# Patient Record
Sex: MALE | Race: WHITE | NOT HISPANIC OR LATINO | URBAN - METROPOLITAN AREA
[De-identification: names, ages, dates, MRNs, and addresses within clinical notes are randomized per-mention and may not be internally consistent; named-entity substitution may affect disease eponyms.]

---

## 2018-11-28 ENCOUNTER — EMERGENCY (EMERGENCY)
Facility: HOSPITAL | Age: 28
LOS: 1 days | Discharge: LEFT BEFORE DISCHARGE | End: 2018-11-28
Attending: EMERGENCY MEDICINE | Admitting: EMERGENCY MEDICINE
Payer: SELF-PAY

## 2018-11-28 VITALS
OXYGEN SATURATION: 98 % | RESPIRATION RATE: 18 BRPM | SYSTOLIC BLOOD PRESSURE: 137 MMHG | HEART RATE: 70 BPM | DIASTOLIC BLOOD PRESSURE: 73 MMHG

## 2018-11-28 VITALS
RESPIRATION RATE: 20 BRPM | OXYGEN SATURATION: 98 % | SYSTOLIC BLOOD PRESSURE: 183 MMHG | TEMPERATURE: 98 F | HEART RATE: 136 BPM | DIASTOLIC BLOOD PRESSURE: 103 MMHG

## 2018-11-28 LAB
ALBUMIN SERPL ELPH-MCNC: 3.8 G/DL — SIGNIFICANT CHANGE UP (ref 3.4–5)
ALP SERPL-CCNC: 113 U/L — SIGNIFICANT CHANGE UP (ref 40–120)
ALT FLD-CCNC: 31 U/L — SIGNIFICANT CHANGE UP (ref 12–42)
ANION GAP SERPL CALC-SCNC: 20 MMOL/L — HIGH (ref 9–16)
APTT BLD: 27.6 SEC — SIGNIFICANT CHANGE UP (ref 27.5–36.3)
AST SERPL-CCNC: 143 U/L — HIGH (ref 15–37)
BILIRUB SERPL-MCNC: 0.4 MG/DL — SIGNIFICANT CHANGE UP (ref 0.2–1.2)
BUN SERPL-MCNC: 5 MG/DL — LOW (ref 7–23)
CALCIUM SERPL-MCNC: 9.2 MG/DL — SIGNIFICANT CHANGE UP (ref 8.5–10.5)
CHLORIDE SERPL-SCNC: 97 MMOL/L — SIGNIFICANT CHANGE UP (ref 96–108)
CO2 SERPL-SCNC: 19 MMOL/L — LOW (ref 22–31)
CREAT SERPL-MCNC: 1.02 MG/DL — SIGNIFICANT CHANGE UP (ref 0.5–1.3)
ETHANOL SERPL-MCNC: 4 MG/DL — HIGH
GLUCOSE SERPL-MCNC: 228 MG/DL — HIGH (ref 70–99)
HCT VFR BLD CALC: 30 % — LOW (ref 39–50)
HGB BLD-MCNC: 10.4 G/DL — LOW (ref 13–17)
INR BLD: 1.05 — SIGNIFICANT CHANGE UP (ref 0.88–1.16)
LACTATE SERPL-SCNC: 2.3 MMOL/L — HIGH (ref 0.4–2)
LACTATE SERPL-SCNC: 9.7 MMOL/L — CRITICAL HIGH (ref 0.4–2)
MAGNESIUM SERPL-MCNC: 1.4 MG/DL — LOW (ref 1.6–2.6)
MCHC RBC-ENTMCNC: 34.7 G/DL — SIGNIFICANT CHANGE UP (ref 32–36)
MCHC RBC-ENTMCNC: 34.9 PG — HIGH (ref 27–34)
MCV RBC AUTO: 100.7 FL — HIGH (ref 80–100)
PLATELET # BLD AUTO: 146 K/UL — LOW (ref 150–400)
POTASSIUM SERPL-MCNC: 3.4 MMOL/L — LOW (ref 3.5–5.3)
POTASSIUM SERPL-SCNC: 3.4 MMOL/L — LOW (ref 3.5–5.3)
PROT SERPL-MCNC: 8 G/DL — SIGNIFICANT CHANGE UP (ref 6.4–8.2)
PROTHROM AB SERPL-ACNC: 11.7 SEC — SIGNIFICANT CHANGE UP (ref 10–12.9)
RBC # BLD: 2.98 M/UL — LOW (ref 4.2–5.8)
RBC # FLD: 16.8 % — HIGH (ref 10.3–14.5)
SODIUM SERPL-SCNC: 136 MMOL/L — SIGNIFICANT CHANGE UP (ref 132–145)
WBC # BLD: 10.1 K/UL — SIGNIFICANT CHANGE UP (ref 3.8–10.5)
WBC # FLD AUTO: 10.1 K/UL — SIGNIFICANT CHANGE UP (ref 3.8–10.5)

## 2018-11-28 PROCEDURE — 70450 CT HEAD/BRAIN W/O DYE: CPT | Mod: 26,59

## 2018-11-28 PROCEDURE — 93010 ELECTROCARDIOGRAM REPORT: CPT

## 2018-11-28 PROCEDURE — 70496 CT ANGIOGRAPHY HEAD: CPT | Mod: 26

## 2018-11-28 PROCEDURE — 70498 CT ANGIOGRAPHY NECK: CPT | Mod: 26

## 2018-11-28 PROCEDURE — 99285 EMERGENCY DEPT VISIT HI MDM: CPT | Mod: 25

## 2018-11-28 RX ORDER — DIAZEPAM 5 MG
5 TABLET ORAL ONCE
Qty: 0 | Refills: 0 | Status: DISCONTINUED | OUTPATIENT
Start: 2018-11-28 | End: 2018-11-28

## 2018-11-28 RX ORDER — SODIUM CHLORIDE 9 MG/ML
1000 INJECTION INTRAMUSCULAR; INTRAVENOUS; SUBCUTANEOUS ONCE
Qty: 0 | Refills: 0 | Status: COMPLETED | OUTPATIENT
Start: 2018-11-28 | End: 2018-11-28

## 2018-11-28 RX ORDER — POTASSIUM CHLORIDE 20 MEQ
40 PACKET (EA) ORAL ONCE
Qty: 0 | Refills: 0 | Status: COMPLETED | OUTPATIENT
Start: 2018-11-28 | End: 2018-11-28

## 2018-11-28 RX ORDER — MAGNESIUM SULFATE 500 MG/ML
2 VIAL (ML) INJECTION ONCE
Qty: 0 | Refills: 0 | Status: DISCONTINUED | OUTPATIENT
Start: 2018-11-28 | End: 2018-12-02

## 2018-11-28 RX ADMIN — SODIUM CHLORIDE 2000 MILLILITER(S): 9 INJECTION INTRAMUSCULAR; INTRAVENOUS; SUBCUTANEOUS at 12:12

## 2018-11-28 RX ADMIN — SODIUM CHLORIDE 2000 MILLILITER(S): 9 INJECTION INTRAMUSCULAR; INTRAVENOUS; SUBCUTANEOUS at 11:16

## 2018-11-28 RX ADMIN — Medication 1 MILLIGRAM(S): at 11:16

## 2018-11-28 RX ADMIN — Medication 40 MILLIEQUIVALENT(S): at 13:42

## 2018-11-28 RX ADMIN — Medication 5 MILLIGRAM(S): at 12:13

## 2018-11-28 NOTE — ED ADULT NURSE NOTE - CHIEF COMPLAINT QUOTE
BIBA, here for altered mental status and possible syncopal episode. Polish speaking. Per EMS, bystanders said pt collapsed on the street, (+) LOC. Pt denies alcohol/drug use. Denies any PMhx. Hypertensive and tachycardic on arrival. BGL in field 150mg/dl

## 2018-11-28 NOTE — ED ADULT TRIAGE NOTE - CHIEF COMPLAINT QUOTE
BIBA, here for altered mental status and possible syncopal episode. Pt arrived alert and oriented x3. Per EMS, bystanders said pt collapsed on the street, (+) LOC. Pt denies alcohol/drug use. Denies any PMhx. Hypertensive and tachycardic on arrival BIBA, here for altered mental status and possible syncopal episode. Polish speaking. Per EMS, bystanders said pt collapsed on the street, (+) LOC. Pt denies alcohol/drug use. Denies any PMhx. Hypertensive and tachycardic on arriva. BGL in field 150mg/dl BIBA, here for altered mental status and possible syncopal episode. Polish speaking. Per EMS, bystanders said pt collapsed on the street, (+) LOC. Pt denies alcohol/drug use. Denies any PMhx. Hypertensive and tachycardic on arrival. BGL in field 150mg/dl

## 2018-11-28 NOTE — ED PROVIDER NOTE - ATTENDING CONTRIBUTION TO CARE
Patient presenting with likely sz (over syncope) given mental status on arrival and EtOH level of 4. He says he had a beer last night (eventually agreed to having had a beer). Denies EtOH abuse. VSS scalp hematoma, tremulous slightly.  Seemed "off affectively on arrival. Got better. Offered admission for likely EtOH WD (though he adamantly denied EtOH use) vs. sz. given lactate of 9 on arrival. Patient refused. extensive discussed with . Patient understood concerns and possible dx. He is a  and has to work tomorrow. Signed out AMA.

## 2018-11-28 NOTE — ED PROVIDER NOTE - OBJECTIVE STATEMENT
27 yo M with no known PMHx, BIBA for AMS. Pt reports going to work, didn't know what happened exactly but woke up in ambulance.  Per EMS, bystanders states that pt was walking, collapsed and had possible seizure like activities with head trauma.  No tongue biting, bleeding, or urinary/bowel incontinence noted.  Pt reports no active sx currently and denies any alcohol/drug use.  No prodromes, CP, SOB, palpitations, HA, dizziness, neck pain, tingling/numbness, paresthesia, abdominal pain, focal weakness, dysuria, and fever/chills.  No recent travel or sick contact noted

## 2018-11-28 NOTE — ED PROVIDER NOTE - PROGRESS NOTE DETAILS
While awaiting for transport, pt now refused to be admitted and reports feeling better and has personal errands and commitments and cannot be admitted to hospital.  Pt has decided to leave the hospital against medical advice. Risks, benefits, alternatives, and potential consequences of current condition (including the possibility of worsening of disease, seizure, pain, permanent disability, and/or death) have been fully discussed and explained to pt via pacific  bedside.  Pt has had the chance to ask questions, verbalized understanding of the aforementioned and still wishes to sign out against medical advice. The patient is awake, alert, oriented  x 3 and has demonstrated capacity to refuse/direct care. Strict return precautions discussed at bedside. Pt was informed to return to ER at any time should he changes his mind or has worsening sx or concerns.

## 2018-11-28 NOTE — ED ADULT NURSE NOTE - NSIMPLEMENTINTERV_GEN_ALL_ED
Implemented All Universal Safety Interventions:  [place] to call system. Call bell, personal items and telephone within reach. Instruct patient to call for assistance. Room bathroom lighting operational. Non-slip footwear when patient is off stretcher. Physically safe environment: no spills, clutter or unnecessary equipment. Stretcher in lowest position, wheels locked, appropriate side rails in place.

## 2018-11-28 NOTE — ED ADULT NURSE REASSESSMENT NOTE - NS ED NURSE REASSESS COMMENT FT1
upon [facility] arrival for transport to [facility], pt refusing to leave the ED and be admitted and stay in the hospital. Providers Zeina and Jenna spoke to pt and explained the risks and benefits of staying the hospital and completing tx. Pt still refused, states "I feel better. I don't need to stay." Pt however verbalized understanding. Pt signed out AMA.      used: Polish language: Chanell [phone]

## 2018-11-28 NOTE — ED ADULT NURSE NOTE - EXPLANATION OF PATIENT'S REASON FOR LEAVING
refused to stay in the hospital and complete treatment, providers gui and pauly spoke to pt and explained risks and benefits of staying.

## 2018-11-28 NOTE — ED PROVIDER NOTE - PHYSICAL EXAMINATION
Vital Signs - nursing notes reviewed and confirmed  Gen - WDWN M, anxious appearing, speaking in full sentences   Skin - warm, dry, intact  HEENT - NC, +scalp contusion to the occipital region, PERRL, EOMI, no conjunctival injection, moist oral mucosa, TM intact b/l with good cone of lights, o/p clear with no erythema, edema, or exudate, uvula midline, airway patent, neck supple and NT, FROM, no JVD or carotid bruits b/l  CV - S1S2, rapid rate, regular rhythm   Resp - respiration non-labored, CTAB, symmetric bs b/l, no r/r/w  GI - NABS, soft, ND, NT, no rebound or guarding, no CVAT b/l   MS - w/w/p, no c/c/e, calves supple and NT, distal pulses symmetric b/l, brisk cap refills, +SILT  Neuro - AxOx3, no focal neuro deficits, CN II-XII grossly intact, +tongue fasciculation, +fine tremors, ambulatory without gait disturbance Vital Signs - nursing notes reviewed and confirmed  Gen - WDWN M, anxious appearing, speaking in full sentences   Skin - warm, dry, intact  HEENT - NC, +scalp contusion to the R parietal and occipital region, PERRL, EOMI, no conjunctival injection, moist oral mucosa, TM intact b/l with good cone of lights, o/p clear with no erythema, edema, or exudate, uvula midline, airway patent, neck supple and NT, FROM, no JVD or carotid bruits b/l  CV - S1S2, rapid rate, regular rhythm   Resp - respiration non-labored, CTAB, symmetric bs b/l, no r/r/w  GI - NABS, soft, ND, NT, no rebound or guarding, no CVAT b/l   MS - w/w/p, no c/c/e, calves supple and NT, distal pulses symmetric b/l, brisk cap refills, +SILT  Neuro - AxO to person and place, slightly disorientated, no focal neuro deficits, CN II-XII grossly intact, +tongue fasciculation, +fine tremors, ambulatory without gait disturbance

## 2018-11-28 NOTE — ED PROVIDER NOTE - MEDICAL DECISION MAKING DETAILS
pt p/w AMS with head trauma, possible seizure activities noted, AxOx2 on arrival, slightly disorientated, +AOB, but not forthcoming about alcohol use, noted tremulous/tachycardiac/hypertensive on arrival, suspecting possible alcohol withdrawal seizure, labs significant for anemia and thrombocytopenia with elevated LA, likely 2/2 seizure vs alcohol induced, s/p IVF and benzo with marked improvement in sx, current CIWA score 2, will admit to [facility] medicine for further w/u and monitoring, case discussed with FUAD and hospitalist, accepted by Dr. Pizarro

## 2018-11-28 NOTE — ED PROVIDER NOTE - CARE PROVIDER_API CALL
Elly Bautista (DO), Internal Medicine  [address] [place] [address]  Phone: [phone]  Fax: [phone]
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
## 2018-12-02 DIAGNOSIS — F10.239 ALCOHOL DEPENDENCE WITH WITHDRAWAL, UNSPECIFIED: ICD-10-CM
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
## 2018-12-02 DIAGNOSIS — F17.200 NICOTINE DEPENDENCE, UNSPECIFIED, UNCOMPLICATED: ICD-10-CM

## 2018-12-02 DIAGNOSIS — R41.82 ALTERED MENTAL STATUS, UNSPECIFIED: ICD-10-CM

## 2018-12-02 DIAGNOSIS — S00.03XA CONTUSION OF SCALP, INITIAL ENCOUNTER: ICD-10-CM

## 2019-11-14 NOTE — ED PROVIDER NOTE - CHIEF COMPLAINT
Problem: Ineffective Coping  Goal: Participates in unit activities  Description  Interventions:  - Provide therapeutic environment   - Provide required programming   - Redirect inappropriate behaviors   Outcome: Progressing  Goal: Identifies healthy coping skills  Outcome: Progressing  Goal: Participates in unit activities  Description  Interventions:  - Provide therapeutic environment   - Provide required programming   - Redirect inappropriate behaviors   Outcome: Progressing The patient is a 28y Male complaining of altered mental status.